# Patient Record
Sex: FEMALE | Race: WHITE | NOT HISPANIC OR LATINO
[De-identification: names, ages, dates, MRNs, and addresses within clinical notes are randomized per-mention and may not be internally consistent; named-entity substitution may affect disease eponyms.]

---

## 2022-08-30 PROBLEM — Z00.00 ENCOUNTER FOR PREVENTIVE HEALTH EXAMINATION: Status: ACTIVE | Noted: 2022-08-30

## 2022-08-31 ENCOUNTER — APPOINTMENT (OUTPATIENT)
Dept: OTOLARYNGOLOGY | Facility: CLINIC | Age: 22
End: 2022-08-31

## 2022-08-31 VITALS
WEIGHT: 104 LBS | HEIGHT: 59 IN | HEART RATE: 99 BPM | SYSTOLIC BLOOD PRESSURE: 104 MMHG | DIASTOLIC BLOOD PRESSURE: 69 MMHG | BODY MASS INDEX: 20.96 KG/M2

## 2022-08-31 DIAGNOSIS — J31.2 CHRONIC PHARYNGITIS: ICD-10-CM

## 2022-08-31 DIAGNOSIS — Z84.89 FAMILY HISTORY OF OTHER SPECIFIED CONDITIONS: ICD-10-CM

## 2022-08-31 DIAGNOSIS — J30.2 OTHER SEASONAL ALLERGIC RHINITIS: ICD-10-CM

## 2022-08-31 DIAGNOSIS — J35.2 HYPERTROPHY OF ADENOIDS: ICD-10-CM

## 2022-08-31 PROCEDURE — 31575 DIAGNOSTIC LARYNGOSCOPY: CPT

## 2022-08-31 PROCEDURE — 99243 OFF/OP CNSLTJ NEW/EST LOW 30: CPT | Mod: 25

## 2022-08-31 RX ORDER — CETIRIZINE HCL 10 MG
10 TABLET ORAL
Refills: 0 | Status: ACTIVE | COMMUNITY

## 2022-08-31 RX ORDER — IPRATROPIUM BROMIDE 21 UG/1
0.03 SPRAY NASAL 4 TIMES DAILY
Qty: 1 | Refills: 4 | Status: ACTIVE | COMMUNITY
Start: 2022-08-31 | End: 1900-01-01

## 2022-08-31 NOTE — CONSULT LETTER
[Dear  ___] : Dear  [unfilled], [Courtesy Letter:] : I had the pleasure of seeing your patient, [unfilled], in my office today. [Please see my note below.] : Please see my note below. [Consult Closing:] : Thank you very much for allowing me to participate in the care of this patient.  If you have any questions, please do not hesitate to contact me. [Sincerely,] : Sincerely, [FreeTextEntry1] : Ramu Issa MD FACS

## 2022-08-31 NOTE — REVIEW OF SYSTEMS
[Throat Dryness] : throat dryness [Throat Itching] : throat itching [Cough] : cough [Negative] : Heme/Lymph

## 2022-08-31 NOTE — HISTORY OF PRESENT ILLNESS
[de-identified] : Patient states shes has had a scratchy throat and constant clearing for the past 3 month. She states shes been taking OTC decongestants but nothing has help. She denies throat pain. She denies smoking. Denies reflux, indigestion, or heartburn. Patient adds she has a left preauricular sinus tract that she cleans.

## 2022-08-31 NOTE — PHYSICAL EXAM
[Midline] : trachea located in midline position [Normal] : no rashes [FreeTextEntry7] : Left preauricular sinus tract  [FreeTextEntry1] : -deviated septum - severe to the right\par -mildly inflamed turbs  [de-identified] : class 2  [de-identified] : little mucus posterior pharyngeal wall. Type 3 oral cavity [FreeTextEntry2] : sensations intact. sinuses nontender to percussion

## 2022-08-31 NOTE — REASON FOR VISIT
[Initial Consultation] : an initial consultation for [FreeTextEntry2] : scratchy throat/chronic cough

## 2022-08-31 NOTE — ASSESSMENT
[FreeTextEntry1] : Reviewed and reconciled medications, allergies, PMHx, PSHx, SocHx, FMHx \par \par Patient states shes has had a scratchy throat and constant clearing for the past 3 month. She states shes been taking OTC decongestants but nothing has help. She denies throat pain. She denies smoking. Denies reflux, indigestion, or heartburn.\par \par Physical Exam:\par -little mucus on the posterior pharyngeal wall. \par -Type 3 oral cavity\par -deviated septum - severe to the right\par -mildly inflamed turbs \par \par Flexible Laryngoscopy: \par -clear to the nasopharynx\par -adenoidal tissue in nasopharynx\par -BOT looks normal\par -incomplete closure of the cords\par -Edema and  of the postcricoid, arytenoids and interarytenoids. \par \par Plan: Start Atrovent - 1 or 2 sprays bilaterally up to QID, spray laterally. Start sinus rinse. If this doesn’t get better we will order a FEEST and videostrobe.\par \par

## 2022-09-01 ENCOUNTER — NON-APPOINTMENT (OUTPATIENT)
Age: 22
End: 2022-09-01

## 2022-09-20 ENCOUNTER — APPOINTMENT (OUTPATIENT)
Dept: OTOLARYNGOLOGY | Facility: CLINIC | Age: 22
End: 2022-09-20
Payer: COMMERCIAL

## 2022-09-20 PROCEDURE — 92612 ENDOSCOPY SWALLOW (FEES) VID: CPT | Mod: GN

## 2022-09-20 PROCEDURE — 31579 LARYNGOSCOPY TELESCOPIC: CPT | Mod: GN

## 2022-09-20 NOTE — ASSESSMENT
[FreeTextEntry1] : REPORT OF EVALUATION OF SWALLOW FUNCTION VIA FLEXIBLE ENDOSCOPIC EXAMINATION OF SWALLOWING (FEES) and ASSESSMENT OF VOCAL FUNCTION VIA VIDEOSTROBOSCOPY \par \par History: Information on this patient has been provided by the patient and via chart review. Clem Corbin is a 21 year-old female who was seen for a Flexible Endoscopic Examination of swallowing to: _x__rule out aspiration; _x__assess for diet texture change as appropriate; and/or __x_ explore positional strategies and/or compensatory techniques to eliminate aspiration. Videostroboscopy assessment was also conducted for a formal assessment of the vocal mechanism. \par \par Reason For Referral: To determine patient's ability to safely tolerate an oral diet and for formal assessment of the vocal mechanism. The physician ordered this procedure because he wants the patient to meet her nutrition/hydration needs by mouth without compromising respiratory status and to formally assess the vocal mechanism during phonation. Clem was alert and cooperative upon arrival to today's evaluation. She was accompanied by her mother. Patient presented with complaints of cough since the beginning of June, which occurs sporadically outside of meal times. Patient reports testing negative for Covid multiple times. She denies voice changes, difficulty swallowing, recent history of pneumonia and unintentional weight loss. \par \par Medical History, per charting:\par Active Problems\par Chronic rhinitis (472.0) (J31.0)\par Chronic throat clearing (784.99) (R68.89)\par Post-nasal drip (784.91) (R09.82)\par Laryngeal edema determined by laryngoscopy (478.6) (J38.4)\par Adenoid hypertrophy (474.12) (J35.2)\par Sore throat, chronic (472.1) (J31.2)\par Past Medical History\par History of Seasonal allergies (477.9) (J30.2)\par \par Surgical History\par No history of surgery\par \par Family History\par Family history of allergies (V19.6) (Z84.89)\par \par Social History\par No significant social history\par \par Current Meds\par Zyrtec 10 MG TABS\par \par Allergies\par No Known Drug Allergies\par Pollen\par \par Current Respiratory Status: _x_ Normal ___ Tracheostomy Tube \par \par VIDEOSTROBOSCOPY: \par The patient was explained the videostroboscopy and FEES procedures, and consent was obtained. A nasendoscope is passed via the left nasal passage and positioned above the supraglottic structures. The epiglottis, aryepiglottic folds, arytenoids, and true vocal folds are clearly visible with mild to moderate edema and erythema of the postcricoid, interarytenoid and arytenoids, which extends into the posterior aspect of the false vocal cords bilaterally. Assessment of TVF was unremarkable; no mass or lesion noted. Assessment of TVF abduction and adduction reveals mildly reduced movement of the L true vocal cord during phonation with compensation from the R to achieve full glottic closure. In addition, the patient is noted to exhibit hyperfunction resulting in recruitment from the arytenoids and false false vocal cords, with greater recruitment from the L side than R side. Amplitude and magnitude of mucosal wave is judged to be mildly increased on the R and mildly decreased on the L. \par \par FEES ASSESSMENT \par Consistencies Administered: \par Solids: _x__ Regular ___Soft & Bite-Sized __x_Pureed \par Liquids: _x_ Thin ___Mildly-Thick __Moderately-Thick \par _x_ single cup sips _x_ consecutive sips\par \par FEES PROCEDURE: \par For the purposes of today’s assessment, the patient is provided with pureed, regular solid and thin liquid textures. The patient demonstrates timely pharyngeal triggering without any evidence of premature spillover. There is adequate tongue base propulsion/retraction and adequate pharyngeal squeeze. No evidence of pharyngeal stasis noted. In addition, there is adequate hyolaryngeal excursion and epiglottic deflection with complete closure of the laryngeal vestibule given no evidence of laryngeal penetration/aspiration noted before or after the swallow across all textures Of note, there is evidence of active laryngopharyngeal reflux (LPR) marked by the return of green tinged material along the postcricoid with further migration into the R pyriform sinus. At this point, the scope was removed and testing was discontinued, with the patient tolerating the procedure without difficulty. \par \par Aspiration - Penetration Scale: 1 - Pureed; Regular Solid; Thin Liquid\par \par Aspiration - Penetration Scale (Rosenbek et al Dysphagia 11:93-98 (April 1996), Aspiration-Penetration Scale) \par 1. Material does not enter the airway \par 2. Material enters the airway, remains above the vocal folds, and is ejected from the airway \par 3. Material enters the airway, remains above the vocal folds, and is not ejected \par 4. Material enters the airway, contacts the vocal folds, and is ejected from the airway \par 5. Material enters the airway, contacts the vocal folds, and is not ejected from the airway \par 6. Material enters the airway, passes below the vocal folds and is ejected into the larynx or out of the airway \par 7. Material enters the airway, passes below the vocal folds, and is not ejected from the trachea despite effort \par 8. Material enters the airway, passes below the vocal folds, and no effort is made to eject \par \par IMPRESSIONS: 1) Pharyngeal phase of swallow is deemed WFLs; 2) +LPR as described above; 3) Mild dysphonia \par  \par RECOMMENDATIONS:\par 1) Continue a Regular Solid / Thin Liquid diet\par 2) Reflux Precautions\par 3) Consider a short course of voice therapy if symptoms do not improve with reflux management. Swallow therapy is not warranted at this time. \par 4) Follow up with referring MD for reflux management\par \par EDUCATION: The above recommendations were discussed with the patient. In addition, verbal and written educational information regarding reflux precautions were also provided. \par \par Should you have additional questions/concerns, please contact this department at [phone].\par \par Lexis Aggarwal M.S., CCC-SLP\par Speech-Language Pathologist. \par \par

## 2022-10-07 ENCOUNTER — APPOINTMENT (OUTPATIENT)
Dept: OTOLARYNGOLOGY | Facility: CLINIC | Age: 22
End: 2022-10-07

## 2022-10-07 VITALS
SYSTOLIC BLOOD PRESSURE: 100 MMHG | HEART RATE: 91 BPM | DIASTOLIC BLOOD PRESSURE: 68 MMHG | WEIGHT: 104 LBS | HEIGHT: 59 IN | BODY MASS INDEX: 20.96 KG/M2

## 2022-10-07 DIAGNOSIS — R06.89 DYSPNEA, UNSPECIFIED: ICD-10-CM

## 2022-10-07 DIAGNOSIS — R09.82 POSTNASAL DRIP: ICD-10-CM

## 2022-10-07 DIAGNOSIS — K21.9 GASTRO-ESOPHAGEAL REFLUX DISEASE W/OUT ESOPHAGITIS: ICD-10-CM

## 2022-10-07 DIAGNOSIS — R49.9 UNSPECIFIED VOICE AND RESONANCE DISORDER: ICD-10-CM

## 2022-10-07 DIAGNOSIS — R06.00 DYSPNEA, UNSPECIFIED: ICD-10-CM

## 2022-10-07 DIAGNOSIS — J31.0 CHRONIC RHINITIS: ICD-10-CM

## 2022-10-07 DIAGNOSIS — J38.4 EDEMA OF LARYNX: ICD-10-CM

## 2022-10-07 DIAGNOSIS — R68.89 OTHER GENERAL SYMPTOMS AND SIGNS: ICD-10-CM

## 2022-10-07 PROCEDURE — 99214 OFFICE O/P EST MOD 30 MIN: CPT

## 2022-10-07 RX ORDER — OMEPRAZOLE 20 MG/1
20 CAPSULE, DELAYED RELEASE ORAL DAILY
Qty: 30 | Refills: 5 | Status: ACTIVE | COMMUNITY
Start: 2022-10-07 | End: 1900-01-01

## 2022-10-07 RX ORDER — FAMOTIDINE 20 MG/1
20 TABLET, FILM COATED ORAL
Qty: 30 | Refills: 5 | Status: ACTIVE | COMMUNITY
Start: 2022-10-07 | End: 1900-01-01

## 2022-10-07 NOTE — ASSESSMENT
[FreeTextEntry1] : Reviewed and reconciled medications, allergies, PMHx, PSHx, SocHx, FMHx \par \par Pt present today to discuss FEEST and videostrobe results \par \par FEEST and videostrobe 09/20/2022 \par hyperfunction \par Edema and erythema of the postcricoid, arytenoids\par videostrobe - no masses or growths, mild reduced motion of left vc, compensation of right vc, hyperfunction, \par FEEST - reflux\par recommendation: voice therapy for hyperfunction and aggressive treatment for reflux \par \par Plan:\par FEEST and videostrobe results discussed. reflux diet sheet provided. omeprazole 20mg prescribed - one in the morning before breakfast. famotidine prescribed - take at bedtime. speech therapy ordered. more then 50% of the time spent consulting. FU 2-3 months

## 2022-10-07 NOTE — CONSULT LETTER
[Dear  ___] : Dear  [unfilled], [Courtesy Letter:] : I had the pleasure of seeing your patient, [unfilled], in my office today. [Please see my note below.] : Please see my note below. [Consult Closing:] : Thank you very much for allowing me to participate in the care of this patient.  If you have any questions, please do not hesitate to contact me. [Sincerely,] : Sincerely, [FreeTextEntry3] : Ramu Issa MD FACS

## 2022-10-07 NOTE — ADDENDUM
[FreeTextEntry1] : Documented by Gilson Munson acting as scribe for Dr. Issa on 10/07/2022. \par \par All Medical record entries made by the scribe were at my. Dr. Issa direction and personally dictated by me on 10/07/2022. I have reviewed the chart and agree that the record accurately reflects my personal performance of the history, physical exam, assessment and plan. I have also personally directed, reviewed, and agreed with the discharge instructions.

## 2022-10-07 NOTE — HISTORY OF PRESENT ILLNESS
[de-identified] : Pt present today to discuss FEEST and videostrobe results. Pt denies following any reflux diet.

## 2022-10-14 ENCOUNTER — NON-APPOINTMENT (OUTPATIENT)
Age: 22
End: 2022-10-14

## 2022-10-21 ENCOUNTER — NON-APPOINTMENT (OUTPATIENT)
Age: 22
End: 2022-10-21

## 2022-12-09 ENCOUNTER — APPOINTMENT (OUTPATIENT)
Dept: OTOLARYNGOLOGY | Facility: CLINIC | Age: 22
End: 2022-12-09

## 2024-01-15 ENCOUNTER — EMERGENCY (EMERGENCY)
Facility: HOSPITAL | Age: 24
LOS: 0 days | Discharge: HOME | End: 2024-01-15
Attending: STUDENT IN AN ORGANIZED HEALTH CARE EDUCATION/TRAINING PROGRAM
Payer: OTHER MISCELLANEOUS

## 2024-01-15 VITALS
DIASTOLIC BLOOD PRESSURE: 78 MMHG | RESPIRATION RATE: 16 BRPM | SYSTOLIC BLOOD PRESSURE: 121 MMHG | HEART RATE: 98 BPM | TEMPERATURE: 98 F | OXYGEN SATURATION: 100 %

## 2024-01-15 VITALS — WEIGHT: 123.9 LBS | HEIGHT: 59 IN
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
## 2024-01-15 DIAGNOSIS — S60.221A CONTUSION OF RIGHT HAND, INITIAL ENCOUNTER: ICD-10-CM

## 2024-01-15 PROCEDURE — 73130 X-RAY EXAM OF HAND: CPT | Mod: RT

## 2024-01-15 PROCEDURE — 73130 X-RAY EXAM OF HAND: CPT | Mod: 26,RT

## 2024-01-15 PROCEDURE — 99053 MED SERV 10PM-8AM 24 HR FAC: CPT

## 2024-01-15 PROCEDURE — 99283 EMERGENCY DEPT VISIT LOW MDM: CPT | Mod: 25

## 2024-01-15 PROCEDURE — 99284 EMERGENCY DEPT VISIT MOD MDM: CPT

## 2024-01-15 RX ADMIN — Medication 1 TABLET(S): at 08:18

## 2024-01-15 NOTE — ED PROVIDER NOTE - PROGRESS NOTE DETAILS
Antibiotics were given x 1 dose based on triage note had stated patient had a puncture wound.  In fact patient does not have a puncture wound would not continue antibiotics at this time.  X-rays negative for fracture.  Will DC with ice packs.

## 2024-01-15 NOTE — ED PROVIDER NOTE - CLINICAL SUMMARY MEDICAL DECISION MAKING FREE TEXT BOX
22-year-old female here with dog bite.  No broken skin no open wound.  X-ray negative for fracture.  Samuel DUNNE.

## 2024-01-15 NOTE — ED PROVIDER NOTE - OBJECTIVE STATEMENT
22-year-old female healthy comes into the ER after dog bite.  She works as a  .  She states that a [place] Lobo bit her right hand.  The dog was up-to-date on its vaccines.  The patient is also vaccinated against rabies.  No broken skin did not take anything for the pain only an ice pack.  No other injuries. 22-year-old female healthy comes into the ER after dog bite.  She works as a  .  She states that a Italian Lobo bit her right hand.  The dog was up-to-date on its vaccines.  The patient is also vaccinated against rabies.  No broken skin did not take anything for the pain only an ice pack.  No other injuries. 22-year-old female healthy comes into the ER after dog bite.  She works as a  .  She states that a Urdu Lobo bit her right hand.  The dog was up-to-date on its vaccines.  The patient is also vaccinated against rabies.  No broken skin did not take anything for the pain only an ice pack.  No other injuries.

## 2024-01-15 NOTE — ED ADULT NURSE NOTE - OBJECTIVE STATEMENT
Pt presented to the ER with c/o dog bite to right hand. Pt works at the [employer] and there was two dogs that were going to fight. Pt stated that she was going to stop the dogs when the one dog bit her. Pt noted to have a hematoma to her right hand by the thumb, no open area noted. Pt is rabies vaccinated. Pt presented to the ER with c/o dog bite to right hand. Pt works at the [employer] and there was two dogs that were going to fight. Pt stated that she was going to stop the dogs when the one dog bit her. Pt noted to have a hematoma to her right hand by the thumb, no open area noted. Pt is rabies vaccinated. Pt presented to the ER with c/o dog bite to right hand. Pt works at the [employer] and there was two dogs that were going to fight. Pt stated that she was going to stop the dogs when the one dog bit her. Pt noted to have a hematoma to her right hand by the thumb, no open area noted. Pt is rabies vaccinated.

## 2024-01-15 NOTE — ED ADULT TRIAGE NOTE - CHIEF COMPLAINT QUOTE
Pt presents to er with complaints of dog bite to right hand with a small puncture wound, states she is UTD with Tetanus.

## 2024-01-15 NOTE — ED PROVIDER NOTE - NSFOLLOWUPINSTRUCTIONS_ED_ALL_ED_FT
[employer]® CareNotes®  :  [facility]        ANIMAL BITE - General Information    Animal Bite    WHAT YOU NEED TO KNOW:    What do I need to know about an animal bite? Animal bite injuries range from shallow cuts to deep, life-threatening wounds. An animal can cut or puncture the skin when it bites. Your skin may be torn away. Your skin may swell or bruise even if the bite does not break the skin. Animal bites occur more often on the hands, arms, legs, and face. Bites from dogs and cats are the most common injuries.    What does my healthcare provider need to know about my animal bite?    What kind of animal bit you? Is the animal a pet? If so, are its vaccines updated?    When and where did the bite happen? Was the animal bothered by you or another person before it bit? Did the animal show any fear?    Can the animal be brought in to watch it for sickness or disease?    Has the wound been treated? If so, what did you use to treat it?    Do you have any health conditions? Do you currently take any medicines? When was your last tetanus shot?  Which tests may I need after an animal bite? Your healthcare provider will look at how big and deep the bite wounds are. Tell your provider if any area feels numb. Your provider will check how well you can move the bitten area and check for signs of infection. You may also need the following:    Blood tests and a sample of fluid or tissue from your wound may show if you have an infection.    An x-ray may show fractures or foreign objects in your wound.  How is an animal bite treated?    Irrigation and debridement may be needed to clean out your wound. Dead, damaged, or infected tissue may be removed to help your wound heal.    Medicines:  Antibiotics prevent or treat a bacterial infection.    Prescription pain medicine may be given. Ask your healthcare provider how to take this medicine safely. Some prescription pain medicines contain acetaminophen. Do not take other medicines that contain acetaminophen without talking to your healthcare provider. Too much acetaminophen may cause liver damage. Prescription pain medicine may cause constipation. Ask your healthcare provider how to prevent or treat constipation.    A tetanus vaccine may be needed to prevent tetanus. Tetanus is a life-threatening bacterial infection that affects the nerves and muscles. The bacteria can be spread through animal bites.    A rabies vaccine may be needed to prevent rabies. Rabies is a life-threatening viral infection. The virus can be spread through animal bites.    Stitches may be needed if your wound is large and not infected.    Surgery may be needed to repair deep injuries or severe wounds.  What can I do to manage my symptoms?    Apply antibiotic ointment as directed. This helps prevent infection in minor skin wounds. It is available without a doctor's order.    Keep the wound clean and covered. Wash the wound every day with soap and water or germ-killing cleanser. Ask your healthcare provider about the kinds of bandages to use.    Apply ice on your wound. Ice helps decrease swelling and pain. Ice may also help prevent tissue damage. Use an ice pack, or put crushed ice in a plastic bag. Cover it with a towel and place it on your wound for 15 to 20 minutes every hour or as directed.    Elevate the wound area. Raise your wound above the level of your heart as often as you can. This will help decrease swelling and pain. Prop your wound on pillows or blankets to keep it elevated comfortably.  What can I do to prevent an animal bite?    Learn to recognize the signs of a scared animal. Avoid quick, sudden movements.    Do not step between animals that are fighting.    Do not leave an animal alone with a young child, even if it is a pet.    Do not disturb an animal while it eats, sleeps, or cares for its young.    Do not approach an animal you do not know, especially one that is tied up or caged.    Stay away from animals that seem sick or act strangely.    Do not feed or capture wild animals.  When should I seek immediate care?    You have a fever.    Your wound is red, swollen, and draining pus.    You see red streaks on the skin around the wound.    You can no longer move the bitten area.    Your heartbeat and breathing are much faster than usual.    You feel dizzy and confused.  When should I call my doctor?    Your pain does not get better, even after you take pain medicine.    You have nightmares or flashbacks about the animal bite.    You have questions or concerns about your condition or care.  CARE AGREEMENT:    You have the right to help plan your care. Learn about your health condition and how it may be treated. Discuss treatment options with your healthcare providers to decide what care you want to receive. You always have the right to refuse treatment.    © [employer] 1973, 2024    	  back to top            © [employer] 1973, 2024 [employer] CareNotes®  :  [facility]        ANIMAL BITE - General Information    Animal Bite    WHAT YOU NEED TO KNOW:    What do I need to know about an animal bite? Animal bite injuries range from shallow cuts to deep, life-threatening wounds. An animal can cut or puncture the skin when it bites. Your skin may be torn away. Your skin may swell or bruise even if the bite does not break the skin. Animal bites occur more often on the hands, arms, legs, and face. Bites from dogs and cats are the most common injuries.    What does my healthcare provider need to know about my animal bite?    What kind of animal bit you? Is the animal a pet? If so, are its vaccines updated?    When and where did the bite happen? Was the animal bothered by you or another person before it bit? Did the animal show any fear?    Can the animal be brought in to watch it for sickness or disease?    Has the wound been treated? If so, what did you use to treat it?    Do you have any health conditions? Do you currently take any medicines? When was your last tetanus shot?  Which tests may I need after an animal bite? Your healthcare provider will look at how big and deep the bite wounds are. Tell your provider if any area feels numb. Your provider will check how well you can move the bitten area and check for signs of infection. You may also need the following:    Blood tests and a sample of fluid or tissue from your wound may show if you have an infection.    An x-ray may show fractures or foreign objects in your wound.  How is an animal bite treated?    Irrigation and debridement may be needed to clean out your wound. Dead, damaged, or infected tissue may be removed to help your wound heal.    Medicines:  Antibiotics prevent or treat a bacterial infection.    Prescription pain medicine may be given. Ask your healthcare provider how to take this medicine safely. Some prescription pain medicines contain acetaminophen. Do not take other medicines that contain acetaminophen without talking to your healthcare provider. Too much acetaminophen may cause liver damage. Prescription pain medicine may cause constipation. Ask your healthcare provider how to prevent or treat constipation.    A tetanus vaccine may be needed to prevent tetanus. Tetanus is a life-threatening bacterial infection that affects the nerves and muscles. The bacteria can be spread through animal bites.    A rabies vaccine may be needed to prevent rabies. Rabies is a life-threatening viral infection. The virus can be spread through animal bites.    Stitches may be needed if your wound is large and not infected.    Surgery may be needed to repair deep injuries or severe wounds.  What can I do to manage my symptoms?    Apply antibiotic ointment as directed. This helps prevent infection in minor skin wounds. It is available without a doctor's order.    Keep the wound clean and covered. Wash the wound every day with soap and water or germ-killing cleanser. Ask your healthcare provider about the kinds of bandages to use.    Apply ice on your wound. Ice helps decrease swelling and pain. Ice may also help prevent tissue damage. Use an ice pack, or put crushed ice in a plastic bag. Cover it with a towel and place it on your wound for 15 to 20 minutes every hour or as directed.    Elevate the wound area. Raise your wound above the level of your heart as often as you can. This will help decrease swelling and pain. Prop your wound on pillows or blankets to keep it elevated comfortably.  What can I do to prevent an animal bite?    Learn to recognize the signs of a scared animal. Avoid quick, sudden movements.    Do not step between animals that are fighting.    Do not leave an animal alone with a young child, even if it is a pet.    Do not disturb an animal while it eats, sleeps, or cares for its young.    Do not approach an animal you do not know, especially one that is tied up or caged.    Stay away from animals that seem sick or act strangely.    Do not feed or capture wild animals.  When should I seek immediate care?    You have a fever.    Your wound is red, swollen, and draining pus.    You see red streaks on the skin around the wound.    You can no longer move the bitten area.    Your heartbeat and breathing are much faster than usual.    You feel dizzy and confused.  When should I call my doctor?    Your pain does not get better, even after you take pain medicine.    You have nightmares or flashbacks about the animal bite.    You have questions or concerns about your condition or care.  CARE AGREEMENT:    You have the right to help plan your care. Learn about your health condition and how it may be treated. Discuss treatment options with your healthcare providers to decide what care you want to receive. You always have the right to refuse treatment.    © [employer] 1973, 2024    	  back to top            © [employer] 1973, 2024 [employer]® CareNotes®  :  [facility]        ANIMAL BITE - General Information    Animal Bite    WHAT YOU NEED TO KNOW:    What do I need to know about an animal bite? Animal bite injuries range from shallow cuts to deep, life-threatening wounds. An animal can cut or puncture the skin when it bites. Your skin may be torn away. Your skin may swell or bruise even if the bite does not break the skin. Animal bites occur more often on the hands, arms, legs, and face. Bites from dogs and cats are the most common injuries.    What does my healthcare provider need to know about my animal bite?    What kind of animal bit you? Is the animal a pet? If so, are its vaccines updated?    When and where did the bite happen? Was the animal bothered by you or another person before it bit? Did the animal show any fear?    Can the animal be brought in to watch it for sickness or disease?    Has the wound been treated? If so, what did you use to treat it?    Do you have any health conditions? Do you currently take any medicines? When was your last tetanus shot?  Which tests may I need after an animal bite? Your healthcare provider will look at how big and deep the bite wounds are. Tell your provider if any area feels numb. Your provider will check how well you can move the bitten area and check for signs of infection. You may also need the following:    Blood tests and a sample of fluid or tissue from your wound may show if you have an infection.    An x-ray may show fractures or foreign objects in your wound.  How is an animal bite treated?    Irrigation and debridement may be needed to clean out your wound. Dead, damaged, or infected tissue may be removed to help your wound heal.    Medicines:  Antibiotics prevent or treat a bacterial infection.    Prescription pain medicine may be given. Ask your healthcare provider how to take this medicine safely. Some prescription pain medicines contain acetaminophen. Do not take other medicines that contain acetaminophen without talking to your healthcare provider. Too much acetaminophen may cause liver damage. Prescription pain medicine may cause constipation. Ask your healthcare provider how to prevent or treat constipation.    A tetanus vaccine may be needed to prevent tetanus. Tetanus is a life-threatening bacterial infection that affects the nerves and muscles. The bacteria can be spread through animal bites.    A rabies vaccine may be needed to prevent rabies. Rabies is a life-threatening viral infection. The virus can be spread through animal bites.    Stitches may be needed if your wound is large and not infected.    Surgery may be needed to repair deep injuries or severe wounds.  What can I do to manage my symptoms?    Apply antibiotic ointment as directed. This helps prevent infection in minor skin wounds. It is available without a doctor's order.    Keep the wound clean and covered. Wash the wound every day with soap and water or germ-killing cleanser. Ask your healthcare provider about the kinds of bandages to use.    Apply ice on your wound. Ice helps decrease swelling and pain. Ice may also help prevent tissue damage. Use an ice pack, or put crushed ice in a plastic bag. Cover it with a towel and place it on your wound for 15 to 20 minutes every hour or as directed.    Elevate the wound area. Raise your wound above the level of your heart as often as you can. This will help decrease swelling and pain. Prop your wound on pillows or blankets to keep it elevated comfortably.  What can I do to prevent an animal bite?    Learn to recognize the signs of a scared animal. Avoid quick, sudden movements.    Do not step between animals that are fighting.    Do not leave an animal alone with a young child, even if it is a pet.    Do not disturb an animal while it eats, sleeps, or cares for its young.    Do not approach an animal you do not know, especially one that is tied up or caged.    Stay away from animals that seem sick or act strangely.    Do not feed or capture wild animals.  When should I seek immediate care?    You have a fever.    Your wound is red, swollen, and draining pus.    You see red streaks on the skin around the wound.    You can no longer move the bitten area.    Your heartbeat and breathing are much faster than usual.    You feel dizzy and confused.  When should I call my doctor?    Your pain does not get better, even after you take pain medicine.    You have nightmares or flashbacks about the animal bite.    You have questions or concerns about your condition or care.  CARE AGREEMENT:    You have the right to help plan your care. Learn about your health condition and how it may be treated. Discuss treatment options with your healthcare providers to decide what care you want to receive. You always have the right to refuse treatment.    © [employer] 1973, 2024    	  back to top            © [employer] 1973, 2024

## 2024-01-15 NOTE — ED ADULT NURSE NOTE - NSFALLUNIVINTERV_ED_ALL_ED
Bed/Stretcher in lowest position, wheels locked, appropriate side rails in place/Call bell, personal items and telephone in reach/Instruct patient to call for assistance before getting out of bed/chair/stretcher/Non-slip footwear applied when patient is off stretcher/[place] to call system/Physically safe environment - no spills, clutter or unnecessary equipment/Purposeful proactive rounding/Room/bathroom lighting operational, light cord in reach Bed/Stretcher in lowest position, wheels locked, appropriate side rails in place/Call bell, personal items and telephone in reach/Instruct patient to call for assistance before getting out of bed/chair/stretcher/Non-slip footwear applied when patient is off stretcher/[place] to call system/Physically safe environment - no spills, clutter or unnecessary equipment/Purposeful proactive rounding/Room/bathroom lighting operational, light cord in reach Bed/Stretcher in lowest position, wheels locked, appropriate side rails in place/Call bell, personal items and telephone in reach/Instruct patient to call for assistance before getting out of bed/chair/stretcher/Non-slip footwear applied when patient is off stretcher/[place] to call system/Physically safe environment - no spills, clutter or unnecessary equipment/Purposeful proactive rounding/Room/bathroom lighting operational, light cord in reach

## 2024-01-15 NOTE — ED PROVIDER NOTE - PATIENT PORTAL LINK FT
You can access the FollowMyHealth Patient Portal offered by [facility] by registering at the following website: [email or web address]. By joining [employer]’s FollowMyHealth portal, you will also be able to view your health information using other applications (apps) compatible with our system. You can access the FollowMyHealth Patient Portal offered by [facility] by registering at the following website: [email or web address]. By joining [email or web address]’s FollowMyHealth portal, you will also be able to view your health information using other applications (apps) compatible with our system. You can access the FollowMyHealth Patient Portal offered by [facility] by registering at the following website: [email or web address]. By joining [employer]’s FollowMyHealth portal, you will also be able to view your health information using other applications (apps) compatible with our system.

## 2024-01-15 NOTE — ED PROVIDER NOTE - NEUROLOGICAL, MLM
Alert and oriented, no focal deficits, no motor or sensory deficits.  right hand has ecchymosis and swelling in between the first and second metacarpal bones.  There are some erythema to the skin but no broken skin no puncture wound contrary to triage note.

## 2024-02-01 ENCOUNTER — APPOINTMENT (OUTPATIENT)
Dept: ORTHOPEDIC SURGERY | Facility: CLINIC | Age: 24
End: 2024-02-01
Payer: OTHER MISCELLANEOUS

## 2024-02-01 VITALS — HEIGHT: 59 IN | WEIGHT: 124 LBS | BODY MASS INDEX: 25 KG/M2
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
## 2024-02-01 DIAGNOSIS — S61.451A OPEN BITE OF RIGHT HAND, INITIAL ENCOUNTER: ICD-10-CM

## 2024-02-01 PROCEDURE — 99203 OFFICE O/P NEW LOW 30 MIN: CPT

## 2024-02-01 NOTE — HISTORY OF PRESENT ILLNESS
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

## 2024-03-04 ENCOUNTER — APPOINTMENT (OUTPATIENT)
Dept: ORTHOPEDIC SURGERY | Facility: CLINIC | Age: 24
End: 2024-03-04